# Patient Record
Sex: MALE | Race: WHITE | NOT HISPANIC OR LATINO | Employment: FULL TIME | URBAN - NONMETROPOLITAN AREA
[De-identification: names, ages, dates, MRNs, and addresses within clinical notes are randomized per-mention and may not be internally consistent; named-entity substitution may affect disease eponyms.]

---

## 2024-07-04 ENCOUNTER — HOSPITAL ENCOUNTER (EMERGENCY)
Facility: HOSPITAL | Age: 49
Discharge: HOME | End: 2024-07-04
Attending: EMERGENCY MEDICINE
Payer: COMMERCIAL

## 2024-07-04 VITALS
RESPIRATION RATE: 18 BRPM | SYSTOLIC BLOOD PRESSURE: 140 MMHG | OXYGEN SATURATION: 98 % | TEMPERATURE: 100.2 F | BODY MASS INDEX: 26.52 KG/M2 | DIASTOLIC BLOOD PRESSURE: 82 MMHG | WEIGHT: 175 LBS | HEIGHT: 68 IN | HEART RATE: 94 BPM

## 2024-07-04 DIAGNOSIS — L08.9 INFECTED CYST OF SKIN: Primary | ICD-10-CM

## 2024-07-04 DIAGNOSIS — L72.9 INFECTED CYST OF SKIN: Primary | ICD-10-CM

## 2024-07-04 LAB
ALBUMIN SERPL-MCNC: 4.5 G/DL (ref 3.5–5.2)
ALBUMIN/GLOB SERPL: 1.3 G/DL
ALP SERPL-CCNC: 68 U/L (ref 39–117)
ALT SERPL W P-5'-P-CCNC: 19 U/L (ref 1–41)
ANION GAP SERPL CALCULATED.3IONS-SCNC: 12.1 MMOL/L (ref 5–15)
AST SERPL-CCNC: 27 U/L (ref 1–40)
BASOPHILS # BLD AUTO: 0.06 10*3/MM3 (ref 0–0.2)
BASOPHILS NFR BLD AUTO: 0.4 % (ref 0–1.5)
BILIRUB SERPL-MCNC: 0.5 MG/DL (ref 0–1.2)
BILIRUB UR QL STRIP: NEGATIVE
BUN SERPL-MCNC: 14 MG/DL (ref 6–20)
BUN/CREAT SERPL: 12.3 (ref 7–25)
CALCIUM SPEC-SCNC: 9.4 MG/DL (ref 8.6–10.5)
CHLORIDE SERPL-SCNC: 104 MMOL/L (ref 98–107)
CLARITY UR: CLEAR
CO2 SERPL-SCNC: 26.9 MMOL/L (ref 22–29)
COLOR UR: YELLOW
CREAT SERPL-MCNC: 1.14 MG/DL (ref 0.76–1.27)
CRP SERPL-MCNC: 2.1 MG/DL (ref 0–0.5)
D-LACTATE SERPL-SCNC: 1.1 MMOL/L (ref 0.5–2)
DEPRECATED RDW RBC AUTO: 42.2 FL (ref 37–54)
EGFRCR SERPLBLD CKD-EPI 2021: 80.3 ML/MIN/1.73
EOSINOPHIL # BLD AUTO: 0.07 10*3/MM3 (ref 0–0.4)
EOSINOPHIL NFR BLD AUTO: 0.5 % (ref 0.3–6.2)
ERYTHROCYTE [DISTWIDTH] IN BLOOD BY AUTOMATED COUNT: 12.8 % (ref 12.3–15.4)
ERYTHROCYTE [SEDIMENTATION RATE] IN BLOOD: 5 MM/HR (ref 0–15)
GLOBULIN UR ELPH-MCNC: 3.5 GM/DL
GLUCOSE SERPL-MCNC: 107 MG/DL (ref 65–99)
GLUCOSE UR STRIP-MCNC: NEGATIVE MG/DL
HCT VFR BLD AUTO: 47.3 % (ref 37.5–51)
HGB BLD-MCNC: 15.6 G/DL (ref 13–17.7)
HGB UR QL STRIP.AUTO: NEGATIVE
IMM GRANULOCYTES # BLD AUTO: 0.07 10*3/MM3 (ref 0–0.05)
IMM GRANULOCYTES NFR BLD AUTO: 0.5 % (ref 0–0.5)
KETONES UR QL STRIP: NEGATIVE
LEUKOCYTE ESTERASE UR QL STRIP.AUTO: NEGATIVE
LYMPHOCYTES # BLD AUTO: 2.04 10*3/MM3 (ref 0.7–3.1)
LYMPHOCYTES NFR BLD AUTO: 14.5 % (ref 19.6–45.3)
MCH RBC QN AUTO: 29.8 PG (ref 26.6–33)
MCHC RBC AUTO-ENTMCNC: 33 G/DL (ref 31.5–35.7)
MCV RBC AUTO: 90.4 FL (ref 79–97)
MONOCYTES # BLD AUTO: 1.61 10*3/MM3 (ref 0.1–0.9)
MONOCYTES NFR BLD AUTO: 11.5 % (ref 5–12)
NEUTROPHILS NFR BLD AUTO: 10.2 10*3/MM3 (ref 1.7–7)
NEUTROPHILS NFR BLD AUTO: 72.6 % (ref 42.7–76)
NITRITE UR QL STRIP: NEGATIVE
NRBC BLD AUTO-RTO: 0 /100 WBC (ref 0–0.2)
PH UR STRIP.AUTO: 6 [PH] (ref 5–8)
PLATELET # BLD AUTO: 194 10*3/MM3 (ref 140–450)
PMV BLD AUTO: 10.6 FL (ref 6–12)
POTASSIUM SERPL-SCNC: 4.2 MMOL/L (ref 3.5–5.2)
PROT SERPL-MCNC: 8 G/DL (ref 6–8.5)
PROT UR QL STRIP: ABNORMAL
RBC # BLD AUTO: 5.23 10*6/MM3 (ref 4.14–5.8)
SODIUM SERPL-SCNC: 143 MMOL/L (ref 136–145)
SP GR UR STRIP: 1.03 (ref 1–1.03)
UROBILINOGEN UR QL STRIP: ABNORMAL
WBC NRBC COR # BLD AUTO: 14.05 10*3/MM3 (ref 3.4–10.8)

## 2024-07-04 PROCEDURE — 87040 BLOOD CULTURE FOR BACTERIA: CPT | Performed by: NURSE PRACTITIONER

## 2024-07-04 PROCEDURE — 25010000002 DALBAVANCIN 500 MG RECONSTITUTED SOLUTION: Performed by: NURSE PRACTITIONER

## 2024-07-04 PROCEDURE — 86140 C-REACTIVE PROTEIN: CPT | Performed by: NURSE PRACTITIONER

## 2024-07-04 PROCEDURE — 85025 COMPLETE CBC W/AUTO DIFF WBC: CPT | Performed by: NURSE PRACTITIONER

## 2024-07-04 PROCEDURE — 83605 ASSAY OF LACTIC ACID: CPT | Performed by: NURSE PRACTITIONER

## 2024-07-04 PROCEDURE — 96365 THER/PROPH/DIAG IV INF INIT: CPT

## 2024-07-04 PROCEDURE — 85652 RBC SED RATE AUTOMATED: CPT | Performed by: NURSE PRACTITIONER

## 2024-07-04 PROCEDURE — 80053 COMPREHEN METABOLIC PANEL: CPT | Performed by: NURSE PRACTITIONER

## 2024-07-04 PROCEDURE — 96376 TX/PRO/DX INJ SAME DRUG ADON: CPT

## 2024-07-04 PROCEDURE — 96375 TX/PRO/DX INJ NEW DRUG ADDON: CPT

## 2024-07-04 PROCEDURE — 99283 EMERGENCY DEPT VISIT LOW MDM: CPT

## 2024-07-04 PROCEDURE — 81003 URINALYSIS AUTO W/O SCOPE: CPT | Performed by: NURSE PRACTITIONER

## 2024-07-04 PROCEDURE — 36415 COLL VENOUS BLD VENIPUNCTURE: CPT

## 2024-07-04 PROCEDURE — 0 DEXTROSE 5 % SOLUTION: Performed by: NURSE PRACTITIONER

## 2024-07-04 RX ORDER — LIDOCAINE HYDROCHLORIDE 10 MG/ML
10 INJECTION, SOLUTION EPIDURAL; INFILTRATION; INTRACAUDAL; PERINEURAL ONCE
Status: COMPLETED | OUTPATIENT
Start: 2024-07-04 | End: 2024-07-04

## 2024-07-04 RX ORDER — DEXTROSE MONOHYDRATE 50 MG/ML
50 INJECTION, SOLUTION INTRAVENOUS ONCE
Status: COMPLETED | OUTPATIENT
Start: 2024-07-04 | End: 2024-07-04

## 2024-07-04 RX ORDER — HYDROCODONE BITARTRATE AND ACETAMINOPHEN 5; 325 MG/1; MG/1
1 TABLET ORAL EVERY 6 HOURS PRN
Qty: 10 TABLET | Refills: 0 | Status: SHIPPED | OUTPATIENT
Start: 2024-07-04 | End: 2024-07-12

## 2024-07-04 RX ADMIN — DEXTROSE MONOHYDRATE 50 ML: 50 INJECTION, SOLUTION INTRAVENOUS at 22:02

## 2024-07-04 RX ADMIN — DALBAVANCIN 1500 MG: 500 INJECTION, POWDER, FOR SOLUTION INTRAVENOUS at 22:02

## 2024-07-04 RX ADMIN — DEXTROSE 50 ML: 50 INJECTION, SOLUTION INTRAVENOUS at 22:49

## 2024-07-04 RX ADMIN — LIDOCAINE HYDROCHLORIDE 10 ML: 10 INJECTION, SOLUTION EPIDURAL; INFILTRATION; INTRACAUDAL; PERINEURAL at 21:20

## 2024-07-04 NOTE — ED PROVIDER NOTES
Subjective   History of Present Illness  Patient is a 48-year-old male presents today complaining of a possible abscess on his left lower back.  Patient reports he has had a cyst there that had to be removed # but states that he was seen by urgent care and they gave him a oral antibiotics states that area has gotten larger since yesterday.  Patient reports he has had have surgery mentioned in the past.  Patient denies fever.  Patient denies any shortness of breath.  Patient any chest pain.        Review of Systems   Constitutional: Negative.    HENT: Negative.     Eyes: Negative.    Respiratory: Negative.     Cardiovascular: Negative.    Gastrointestinal: Negative.    Endocrine: Negative.    Genitourinary: Negative.    Musculoskeletal: Negative.    Skin: Negative.    Allergic/Immunologic: Negative.    Neurological: Negative.    Hematological: Negative.    Psychiatric/Behavioral: Negative.         No past medical history on file.    No Known Allergies    No past surgical history on file.    No family history on file.    Social History     Socioeconomic History    Marital status:            Objective   Physical Exam  Vitals and nursing note reviewed.   Constitutional:       Appearance: He is well-developed.   HENT:      Head: Normocephalic.      Right Ear: External ear normal.      Left Ear: External ear normal.   Eyes:      Conjunctiva/sclera: Conjunctivae normal.      Pupils: Pupils are equal, round, and reactive to light.   Cardiovascular:      Rate and Rhythm: Normal rate and regular rhythm.      Heart sounds: Normal heart sounds.   Pulmonary:      Effort: Pulmonary effort is normal.      Breath sounds: Normal breath sounds.   Abdominal:      General: Bowel sounds are normal.      Palpations: Abdomen is soft.   Musculoskeletal:         General: Normal range of motion.      Cervical back: Normal range of motion and neck supple.   Skin:     General: Skin is warm and dry.      Capillary Refill: Capillary  refill takes less than 2 seconds.      Comments: Moderate sized area of erythema on left lower back; pustule with fluctuance   Neurological:      Mental Status: He is alert and oriented to person, place, and time.   Psychiatric:         Behavior: Behavior normal.         Thought Content: Thought content normal.         Incision & Drainage    Date/Time: 7/4/2024 9:20 PM    Performed by: Paco Madrid APRN  Authorized by: Steven Naranjo MD    Consent:     Consent obtained:  Verbal    Consent given by:  Patient    Risks, benefits, and alternatives were discussed: yes      Risks discussed:  Bleeding, incomplete drainage and pain  Universal protocol:     Patient identity confirmed:  Verbally with patient  Location:     Indications for incision and drainage: Infected cyst.    Size:  Moderate    Location:  Trunk    Trunk location:  Back  Pre-procedure details:     Skin preparation:  Chlorhexidine with alcohol  Anesthesia:     Anesthesia method:  Local infiltration    Local anesthetic:  Lidocaine 1% w/o epi  Procedure type:     Complexity:  Simple  Procedure details:     Drainage:  Purulent and bloody    Drainage amount:  Moderate    Wound treatment:  Wound left open    Packing materials:  None  Post-procedure details:     Procedure completion:  Tolerated             ED Course                                             Medical Decision Making  Amount and/or Complexity of Data Reviewed  Labs: ordered.    Risk  Prescription drug management.        Final diagnoses:   Infected cyst of skin       ED Disposition  ED Disposition       ED Disposition   Discharge    Condition   Stable    Comment   --               Ebony Escobar MD  [address] [place], [place] [address]  [phone]    Schedule an appointment as soon as possible for a visit   As needed         Medication List        New Prescriptions      HYDROcodone-acetaminophen 5-325 MG per tablet  Commonly known as: NORCO  Take 1 tablet by mouth Every 6 (Six) Hours  As Needed for Severe Pain.               Where to Get Your Medications        These medications were sent to [id number] - [facility] [address] [facility] [address] [facility] [address] - [phone]  - [phone] [address] [facility] [address]      Phone: [phone]   HYDROcodone-acetaminophen 5-325 MG per tablet            Paco Madrid, APRN  07/04/24 4941     g/dL    RDW 12.8 12.3 - 15.4 %    RDW-SD 42.2 37.0 - 54.0 fl    MPV 10.6 6.0 - 12.0 fL    Platelets 194 140 - 450 10*3/mm3    Neutrophil % 72.6 42.7 - 76.0 %    Lymphocyte % 14.5 (L) 19.6 - 45.3 %    Monocyte % 11.5 5.0 - 12.0 %    Eosinophil % 0.5 0.3 - 6.2 %    Basophil % 0.4 0.0 - 1.5 %    Immature Grans % 0.5 0.0 - 0.5 %    Neutrophils, Absolute 10.20 (H) 1.70 - 7.00 10*3/mm3    Lymphocytes, Absolute 2.04 0.70 - 3.10 10*3/mm3    Monocytes, Absolute 1.61 (H) 0.10 - 0.90 10*3/mm3    Eosinophils, Absolute 0.07 0.00 - 0.40 10*3/mm3    Basophils, Absolute 0.06 0.00 - 0.20 10*3/mm3    Immature Grans, Absolute 0.07 (H) 0.00 - 0.05 10*3/mm3    nRBC 0.0 0.0 - 0.2 /100 WBC   Lactic Acid, Plasma    Specimen: Arm, Left; Blood   Result Value Ref Range    Lactate 1.1 0.5 - 2.0 mmol/L              ED Course                                             Medical Decision Making  Patient is a 48-year-old male presents today complaining of a possible abscess on his left lower back.  Patient reports he has had a cyst there that had to be removed # but states that he was seen by urgent care and they gave him a oral antibiotics states that area has gotten larger since yesterday.  Patient reports he has had have surgery mentioned in the past.  Patient denies fever.  Patient denies any shortness of breath.  Patient any chest pain.      Problems Addressed:  Infected cyst of skin: complicated acute illness or injury    Amount and/or Complexity of Data Reviewed  Labs: ordered. Decision-making details documented in ED Course.    Risk  Prescription drug management.        Final diagnoses:   Infected cyst of skin       ED Disposition  ED Disposition       ED Disposition   Discharge    Condition   Stable    Comment   --               Ebony Escobar MD  [address] [place], [place] [id number]  [phone]    Schedule an appointment as soon as possible for a visit   As needed         Medication List      No changes were made to your  prescriptions during this visit.            Paco Madrid, APRN  07/04/24 2124       Paco Madrid, CLARICE  07/15/24 2134

## 2024-07-09 LAB
BACTERIA SPEC AEROBE CULT: NORMAL
BACTERIA SPEC AEROBE CULT: NORMAL

## 2024-07-12 ENCOUNTER — OFFICE VISIT (OUTPATIENT)
Dept: SURGERY | Facility: CLINIC | Age: 49
End: 2024-07-12
Payer: COMMERCIAL

## 2024-07-12 VITALS
DIASTOLIC BLOOD PRESSURE: 60 MMHG | SYSTOLIC BLOOD PRESSURE: 108 MMHG | HEART RATE: 76 BPM | HEIGHT: 68 IN | BODY MASS INDEX: 26.73 KG/M2 | WEIGHT: 176.4 LBS

## 2024-07-12 DIAGNOSIS — L02.212 CUTANEOUS ABSCESS OF BACK EXCLUDING BUTTOCKS: ICD-10-CM

## 2024-07-12 DIAGNOSIS — L72.3 SEBACEOUS CYST: Primary | ICD-10-CM

## 2024-07-12 RX ORDER — SULFAMETHOXAZOLE AND TRIMETHOPRIM 800; 160 MG/1; MG/1
TABLET ORAL
COMMUNITY
Start: 2024-07-03

## 2024-07-12 RX ORDER — CLINDAMYCIN HYDROCHLORIDE 300 MG/1
1 CAPSULE ORAL EVERY 6 HOURS
COMMUNITY
Start: 2024-07-06

## 2024-07-12 NOTE — PROGRESS NOTES
"Subjective   Mike Bowens is a 48 y.o. male here today for ER follow up.    History of Present Illness  Mr. Bowens was seen in the office today following incision and drainage of an infected sebaceous cyst of the back.  Patient states he has had the cyst for years and it has had occasional flareups but never to the extent that it did for which she went to the emergency room on 7/4/2024.  He underwent incision and drainage and was placed on clindamycin and doxycycline and now presents for follow-up.  No Known Allergies  Current Outpatient Medications   Medication Sig Dispense Refill    clindamycin (CLEOCIN) 300 MG capsule Take 1 capsule by mouth Every 6 (Six) Hours.      sulfamethoxazole-trimethoprim (BACTRIM DS,SEPTRA DS) 800-160 MG per tablet TAKE 1 TABLET (ORAL) 2 TIMES PER DAY FOR 10 DAYS FOR SKIN INFECTION       No current facility-administered medications for this visit.     History reviewed. No pertinent past medical history.  Past Surgical History:   Procedure Laterality Date    CYST REMOVAL         Pertinent Review of Systems:  Respiratory: no shortness of breath  Cardiovascular: no chest pain  Other pertinent:      Objective   /60 (BP Location: Left arm)   Pulse 76   Ht 172.7 cm (68\")   Wt 80 kg (176 lb 6.4 oz)   BMI 26.82 kg/m²   Physical Exam  On examination of the left lateral lower back there is an infected sebaceous cyst.  Small amount of purulent material could be obtained.  There is a significant amount of induration and some residual cellulitis but according to the patient this is significantly improved.  It is difficult to determine the actual size of the cyst but the area of induration is at least 4 cm.  Procedures     Results/Data:    Notes: Records from the ED from 7/4/2024 were reviewed      Assessment & Plan   Infected sebaceous cyst of lower back.  As his appears well drained and is improving with the patient going on vacation shortly we will plan for return to the office with " formal excision of the residual cyst in mid-to-late August.         Discussion/Summary    Time spent:     BMI is >= 25 and <30. (Overweight) The following options were offered after discussion;: nutrition counseling/recommendations       No future appointments.      This document has been electronically signed by       July 12, 2024 09:58 EDT      Please note that portions of this note were completed with a voice recognition program.